# Patient Record
Sex: FEMALE | Race: BLACK OR AFRICAN AMERICAN | NOT HISPANIC OR LATINO | URBAN - METROPOLITAN AREA
[De-identification: names, ages, dates, MRNs, and addresses within clinical notes are randomized per-mention and may not be internally consistent; named-entity substitution may affect disease eponyms.]

---

## 2019-03-05 NOTE — PATIENT DISCUSSION
Monitor for changes. Advised patient of condition of cataracts and discussed symptoms of cataracts worsening and becoming more bothersome.

## 2019-10-24 ENCOUNTER — IMPORTED ENCOUNTER (OUTPATIENT)
Dept: URBAN - METROPOLITAN AREA CLINIC 31 | Facility: CLINIC | Age: 54
End: 2019-10-24

## 2019-10-24 PROBLEM — H11.31: Noted: 2019-10-24

## 2019-10-24 PROCEDURE — 99203 OFFICE O/P NEW LOW 30 MIN: CPT

## 2019-10-28 NOTE — PATIENT DISCUSSION
Explained condition is chronic and handout given today.
[facility]
May consider low dose Doxy if symptoms do not improve with Avenova and hot compresses.
Monitor for changes.
Monitor.
RTC as scheduled in March for Exam, sooner if problems or changes.
Recommended hot compresses followed by lid hygiene with Avenova Spray to lids bid. Avenova Sample and handout given today.
all if symptoms worsen, do not improve, or if new symptoms or vision changes occur.
WDL

## 2021-01-22 NOTE — PATIENT DISCUSSION
Monitor for changes. Advised Pt of condition of cataracts and symptoms of worsening and becoming more bothersome. Pt to call if vision changes or increased symptoms.

## 2021-01-22 NOTE — PATIENT DISCUSSION
Continue hot compresses followed by lid hygiene. Pt has used Avenova Spray and ok to refill if pt calls.

## 2022-04-02 ASSESSMENT — VISUAL ACUITY
OD_CC: 20/30
OS_CC: 20/30

## 2022-04-02 ASSESSMENT — TONOMETRY
OS_IOP_MMHG: 13
OD_IOP_MMHG: 13

## 2022-07-01 NOTE — PROCEDURE NOTE: CLINICAL
PROCEDURE NOTE: Removal of Conjunctival FB, Superficial #1 OU. Anesthesia: Topical. Prep: Antibiotic Drops q 5min x 3. Prior to treatment, the risks/benefits/alternatives were discussed. The patient wished to proceed with procedure. Superficial conjunctival FB removed with forcep/needle. Globe and conjunctiva intact. Patient tolerated procedure well. There were no complications. Post procedure instructions given. Rowdy Villarreal

## 2022-11-09 ENCOUNTER — TELEPHONE ENCOUNTER (OUTPATIENT)
Dept: URBAN - METROPOLITAN AREA CLINIC 63 | Facility: CLINIC | Age: 57
End: 2022-11-09

## 2022-11-11 ENCOUNTER — DASHBOARD ENCOUNTERS (OUTPATIENT)
Age: 57
End: 2022-11-11

## 2022-11-11 ENCOUNTER — WEB ENCOUNTER (OUTPATIENT)
Dept: URBAN - METROPOLITAN AREA CLINIC 63 | Facility: CLINIC | Age: 57
End: 2022-11-11

## 2022-11-11 ENCOUNTER — OFFICE VISIT (OUTPATIENT)
Dept: URBAN - METROPOLITAN AREA CLINIC 63 | Facility: CLINIC | Age: 57
End: 2022-11-11
Payer: COMMERCIAL

## 2022-11-11 VITALS
SYSTOLIC BLOOD PRESSURE: 140 MMHG | BODY MASS INDEX: 24.48 KG/M2 | HEIGHT: 67 IN | TEMPERATURE: 97.9 F | WEIGHT: 156 LBS | DIASTOLIC BLOOD PRESSURE: 90 MMHG | OXYGEN SATURATION: 99 % | HEART RATE: 77 BPM

## 2022-11-11 DIAGNOSIS — Z12.11 COLON CANCER SCREENING: ICD-10-CM

## 2022-11-11 DIAGNOSIS — Z01.818 ENCOUNTER FOR OTHER PREPROCEDURAL EXAMINATION: ICD-10-CM

## 2022-11-11 PROCEDURE — 99203 OFFICE O/P NEW LOW 30 MIN: CPT | Performed by: INTERNAL MEDICINE

## 2022-11-11 RX ORDER — POLYETHYLENE GLYCOL-3350, SODIUM CHLORIDE, POTASSIUM CHLORIDE AND SODIUM BICARBONATE 420; 11.2; 5.72; 1.48 G/438.4G; G/438.4G; G/438.4G; G/438.4G
AS DIRECTED POWDER, FOR SOLUTION ORAL 1
Qty: 4000 MILLILITER | Refills: 0 | OUTPATIENT
Start: 2022-11-11 | End: 2022-11-12

## 2022-11-30 ENCOUNTER — TELEPHONE ENCOUNTER (OUTPATIENT)
Dept: URBAN - METROPOLITAN AREA CLINIC 63 | Facility: CLINIC | Age: 57
End: 2022-11-30

## 2022-12-01 ENCOUNTER — OFFICE VISIT (OUTPATIENT)
Dept: URBAN - METROPOLITAN AREA SURGERY CENTER 4 | Facility: SURGERY CENTER | Age: 57
End: 2022-12-01